# Patient Record
Sex: FEMALE | Race: WHITE | NOT HISPANIC OR LATINO | Employment: FULL TIME | URBAN - METROPOLITAN AREA
[De-identification: names, ages, dates, MRNs, and addresses within clinical notes are randomized per-mention and may not be internally consistent; named-entity substitution may affect disease eponyms.]

---

## 2018-03-18 ENCOUNTER — HOSPITAL ENCOUNTER (EMERGENCY)
Facility: MEDICAL CENTER | Age: 28
End: 2018-03-18
Attending: EMERGENCY MEDICINE
Payer: COMMERCIAL

## 2018-03-18 VITALS
HEIGHT: 64 IN | SYSTOLIC BLOOD PRESSURE: 118 MMHG | WEIGHT: 135 LBS | HEART RATE: 88 BPM | BODY MASS INDEX: 23.05 KG/M2 | DIASTOLIC BLOOD PRESSURE: 73 MMHG | TEMPERATURE: 98.6 F | RESPIRATION RATE: 18 BRPM

## 2018-03-18 DIAGNOSIS — F10.920 ALCOHOLIC INTOXICATION WITHOUT COMPLICATION (HCC): ICD-10-CM

## 2018-03-18 PROCEDURE — 99284 EMERGENCY DEPT VISIT MOD MDM: CPT

## 2018-03-18 ASSESSMENT — LIFESTYLE VARIABLES: DO YOU DRINK ALCOHOL: NO

## 2018-03-18 NOTE — DISCHARGE INSTRUCTIONS
Alcohol, Frequently Asked Questions  WHAT IS ALCOHOL?  Ethyl alcohol, or ethanol, affects mood or behavior (psychoactive). It is a drug found in beer, wine, and hard liquor. Hard liquor is whiskey, vodka, gin, etc. Alcohol is produced by the fermentation of yeast, sugars, and starches.   WHAT DOES ALCOHOL DO TO THE BODY?  · Alcohol is a central nervous system depressant.   · It is rapidly absorbed from the stomach and small intestine. It passes into the bloodstream. It is then widely distributed throughout the body.   · Harmful effects of alcohol, can include:   · Impaired judgment.   · Reduced reaction time.   · Slurred speech.   · Difficulty walking.   · The effects of alcohol on the body are directly related to the amount consumed.   · In small amounts, alcohol can have a relaxing effect.   · When consumed rapidly and in large amounts, alcohol can also result in coma and death.   · Alcohol can interact with a number of prescription and non-prescription medications in ways that can intensify the effect of alcohol, of the medications themselves, or both.   · Alcohol use by pregnant women can cause serious damage to the developing fetus.   WHAT IS A STANDARD DRINK?  A standard drink is one 12 ounce beer, one 5 ounce glass of wine, or one 1.5 ounce shot of distilled spirits. Each of these drinks contains about half an ounce of alcohol.   IS BEER OR WINE SAFER TO DRINK THAN HARD LIQUOR?   No. One 12 ounce beer has about the same amount of alcohol as one 5 ounce glass of wine, or one 1.5 ounce shot of liquor.   WHAT IS MODERATE DRINKING?  Based on current [place] Government dietary guidelines, moderate drinking for women is defined as an average of 1 drink or less per day. Moderate drinking for men is defined as an average of 2 drinks or less per day.   WHAT IS HEAVY DRINKING?  Heavy drinking is consuming alcohol in excess of 1 drink per day on average for women and greater than 2 drinks per day on average for men.   WHAT  "IS BINGE DRINKING?  Binge drinking is generally defined as having 5 or more drinks on one occasion. One occasion means in a row or within a short period of time. However, among women, binge drinking is often defined as having 4 or more drinks on one occasion. This lower cut-point is used for women because women are generally of smaller stature than men.   WHAT IS ALCOHOLISM?  Alcoholism is a primary, long-lasting (chronic) disease with inherited (genetic) tendencies. Alcoholism has psychological and social (psychosocial), and environmental factors influencing its development and signs and symptoms. The disease often becomes more severe (progressive) and eventually fatal. It is characterized by continuous or periodic:  · Lack of control over drinking.   · Fixation with the drug alcohol.   · Use of alcohol despite harmful consequences.   · Distortions in thinking, including denial.   WHAT IS ALCOHOL ABUSE?  Alcohol abuse is characterized by recurrent alcohol-related problems, including problems with relationships, job performance, or both; the use of alcohol in hazardous situations (e.g., while driving a car); or some combination of these.  WHY ARE SOME PEOPLE MORE SENSITIVE TO ALCOHOL THAN OTHERS?  Individual reactions to alcohol can vary greatly, and may be influenced by many factors, including:  · Age.   · Gender.   · Race.   · A specific origin or culture (ethnicity).   · Physical condition.   · The amount of food eaten before drinking.   · The use of drugs or medicines.   · Family history of alcohol problems.   · Many other factors as well.   Therefore, while drinking guidelines and definitions of drinking patterns can be very helpful in identifying risky patterns of alcohol use, personal decisions about whether to drink, and if so, when and how much, should take into account these individual factors as well.   WHAT DOES IT MEAN TO DRINK TOO MUCH?  · A person may be said to be \"drinking too much\" or engaging in " "\"excessive drinking\" if they exceed the guidelines for average or daily alcohol consumption.   · Among men, excessive drinking may be defined as more than 4 drinks per day or an average of more than 2 drinks per day over a 7 or 30 day period.   · Among women, excessive drinking may be defined as more than 3 drinks per day or an average of more than 1 drink per day over a 7 or 30 day period.   · Current guidelines state that some individuals (youth under age 21 years, pregnant women, and persons recovering from alcoholism) should not drink at all. Among these people, any alcohol consumption may be too much.   · Anyone who chooses to drink should be aware that individual reactions to alcohol can vary greatly. When unsure about drinking, it is best to consult one's own personal caregiver.   WHAT DOES IT MEAN TO GET DRUNK?  Drunkenness is the state of being intoxicated by consumption of alcoholic beverages to a degree that mental and physical faculties are noticeably impaired. However, the number of drinks that an individual needs to consume to get drunk varies based on a number of factors. These include: age, gender, physical condition, the amount of food eaten before drinking, and the use of drugs or medicines. Binge drinking typically results in intoxication.   WHAT DOES IT MEAN TO BE ABOVE THE LEGAL LIMIT FOR DRINKING?  Legal limits for operating a vehicle are defined by a government agency. Blood or breath tests are used to find out how much alcohol is in your system. If you are found to have more alcohol in your system than is allowed in your region, you will be punished by law. This does not mean it is safe to drive under the legal limit.  WHAT ARE COMMON HEALTH EFFECTS OF DRINKING TOO MUCH?  Excessive drinking, including binge and heavy drinking, has numerous chronic (long-term) and acute (short-term) health effects.  Chronic health consequences of excessive drinking can include:  · Damage to liver cells (liver " "cirrhosis).   · Inflammation of the pancreas (pancreatitis).   · Various cancers, including:   · Liver cancer.   · Mouth cancer.   · Throat cancer.   · Voice box (larynx) cancer.   · Esophageal cancer.   · High blood pressure.   · Psychological disorders.   · Sudden (acute) health consequences of excessive drinking can include:   · Alcohol poisoning and death.   · Motor vehicle injuries.   · Falls.   · Domestic violence.   · Rape.   · Child abuse.   HOW DO I KNOW IF IT IS OKAY TO DRINK?  If you choose to drink alcohol, do so in moderation. Remember that there are some people who should not drink alcohol at all. These people include:  · Children and adolescents.   · People who cannot restrict their drinking to moderate levels.   · Women who may become pregnant or who are pregnant.   · People who plan to drive or operate machinery.   · People taking prescription or over-the-counter medications that can interact with alcohol.   When in doubt, it is always best to consult one's own caregiver.  HOW DO I KNOW IF I HAVE A DRINKING PROBLEM?  Drinking is a problem if it causes trouble in:  · Your relationships.   · School.   · Social activities.   · How you think and feel.   If you are concerned that either you or someone in your family might have a drinking problem, consult your caregivers. There are many resources available to assist people with drinking problems.  Document Released: 12/20/2004 Document Revised: 06/18/2013 Document Reviewed: 12/11/2009  [employer]® Patient Information ©2013 [employer], [employer].  Alcohol and Headaches  Alcohol is a chemical known as ethanol. It is found in beverages such as beer, wine and liquor. Greater amounts are often found in liquor such as whiskey, vodka, scotch, mixed drinks and others. These drinks can also contain chemicals called congeners. Both ethanol and the congeners can effect how the person feels after drinking it. These \"after effects\" are often referred to as a hangover. Hangovers " are rare with moderate alcohol drinking (1 to 3 average drinks). However, hangovers increase when the amount of alcohol consumed is more than moderate.  SYMPTOMS   A hangover can be accompanied by:   · Headache.   · Upset stomach.   · Nausea and vomiting.   · Dehydration.   A hangover is actually a withdrawal state from moderate to heavy alcohol consumption. The recovery process will take longer if you attempt to relieve this withdrawal with more alcohol. Drinking caffeine may relieve some of the fatigue associated with a hangover. However, this can cause more stomach irritation. Caffeine also makes a person urinate more (diuretic) and can worsen dehydration.  TREATMENT   There are a few actions that can reduce a hangover's severity and length.  · Drink 1 to 2 glasses of water (16 to 24 ounces) after you have quit drinking alcohol.   · Use pain medicines carefully and as told by your caregiver. For a headache, avoid acetaminophen. This drug is hard on the liver. Take aspirin instead and drink more water. If aspirin causes more stomach upset, ibuprofen may be a second choice.   · Get rest.   · Avoid high-fat foods and consider eating bananas (restores potassium and magnesium that will help both your stomach and headache). Oranges, apples and pears are good choices too.   · Take vitamins. Alcohol depletes the body's stores of vitamins A, B (especially B6) and C, which can intensify hangover symptoms.   · Drink 16 ounces of water each hour. This will rehydrate your body and make you feel better. Sports drinks containing electrolytes may help your body rehydrate quicker than drinking water alone. The faster you restore proper fluid balance, the sooner you will feel better. Hydration is vital when treating a hangover.   · Exercise. As soon as you feel up to it, sweating helps remove toxins from the body faster.   SEEK MEDICAL CARE IF:   · Hangovers become more frequent.   · Hangovers interfere with major life activities  such as job, family, health and relationships.   · You are unable to control your drinking, professional help may be needed. Contact your physician for a referral to specialized treatment.   SEEK IMMEDIATE MEDICAL CARE IF:   · You throw up blood.   · You pass dark or tarry stools.   · Your headache worsens over the next 24 hours instead getting better.   · Your abdominal or stomach pain worsens instead of getting better over the next 24 hours.   Document Released: 12/20/2004 Document Revised: 03/11/2013 Document Reviewed: 08/05/2009  [employer]® Patient Information ©2013 [employer].

## 2018-03-18 NOTE — ED PROVIDER NOTES
"ED Provider Note    Means of arrival: ambulance  History obtained from: EMS and patient's friend  History limited by: patient's AMS    CHIEF COMPLAINT  Chief Complaint   Patient presents with   • Alcohol Intoxication       HPI  Mara Cantu is a 27 y.o. female who presents to the Emergency Department for alcohol intoxication. Patient's friend who is with her reports that patient was drinking an excessive amount of hard liquor shots this evening and subsequently had emesis and was altered. She was noted to be falling asleep at a dance club and therefore EMS was called. Her friend came with her  reports that she did not incur any trauma. She reports the patient is usually otherwise healthy. Patient is unable to provide further history as she is altered secondary to alcohol intoxication     REVIEW OF SYSTEMS  ROS  No trauma per the friend  Review of systems is limited secondary to altered mental status    PAST MEDICAL HISTORY   none per friend    SURGICAL HISTORY  Unknown, unable to obtain secondary to patient's altered mental status    SOCIAL HISTORY  Positive for alcohol use tonight, unable to obtain further social history secondary to altered mental status    CURRENT MEDICATIONS  Home Medications    Friend denies that patient takes any medications      ALLERGIES  Patient's friend reports that she does not believe she has any allergies    PHYSICAL EXAM  VITAL SIGNS: /73   Pulse 88   Temp 37 °C (98.6 °F)   Resp 18   Ht 1.626 m (5' 4\")   Wt 61.2 kg (135 lb)   BMI 23.17 kg/m²   Vitals reviewed by myself.  Physical Exam   Constitutional: She is well-developed, well-nourished, and in no distress.   Patient's speech is slurred, she is sleepy but arousable.   HENT:   Head: Normocephalic and atraumatic.   Eyes: Pupils are equal, round, and reactive to light.   Neck: Normal range of motion.   Cardiovascular: Normal rate, regular rhythm and normal heart sounds.  Exam reveals no gallop and no " friction rub.    No murmur heard.  Pulmonary/Chest: Effort normal and breath sounds normal. No respiratory distress. She has no wheezes. She has no rales.   Abdominal: Soft. She exhibits no distension. There is no tenderness. There is no rebound and no guarding.   Musculoskeletal: Normal range of motion.   Neurological:   Patient is alert self only, speech is slurred. No focal neurologic deficits.   Skin:   No bruising or abrasions noted anywhere on the body       DIAGNOSTIC STUDIES /  LABS  None    COURSE & MEDICAL DECISION MAKING  Nursing notes, VS, PMSFHx reviewed in chart.    Patient is a 27-year-old female who comes in for alcohol intoxication. Differential diagnosis includes alcohol intoxication, alcohol withdrawal, substance abuse. On physical exam patient appears clinically intoxicated and story is confirmed by the friend. She is otherwise well-appearing with no focal neurologic deficits or signs of trauma. Her vitals are within normal limits. She was given normal saline by EMS. She is not having any active vomiting, therefore she is not treated with Zofran. We'll observe patient in the emergency department until she is clinically sober. When clinically sober she will be reassessed and it safe for discharge discharged home.      FINAL IMPRESSION  1. Alcoholic intoxication without complication (CMS-HCC)

## 2018-03-18 NOTE — ED TRIAGE NOTES
"Chief Complaint   Patient presents with   • Alcohol Intoxication     /73   Pulse 88   Temp 37 °C (98.6 °F)   Resp 18   Ht 1.626 m (5' 4\")   Wt 61.2 kg (135 lb)   BMI 23.17 kg/m²   Patient brought in by kailey for alcohol intoxication. Patient was [place] [employer] when she drank too much and began vomiting and passed out on the dance floor. Patient Presents to ED A&)x2(alert to self and situation). Patient's friend at bedside  "